# Patient Record
Sex: FEMALE
[De-identification: names, ages, dates, MRNs, and addresses within clinical notes are randomized per-mention and may not be internally consistent; named-entity substitution may affect disease eponyms.]

---

## 2021-04-23 ENCOUNTER — NON-APPOINTMENT (OUTPATIENT)
Age: 46
End: 2021-04-23

## 2021-04-23 ENCOUNTER — APPOINTMENT (OUTPATIENT)
Dept: ORTHOPEDIC SURGERY | Facility: CLINIC | Age: 46
End: 2021-04-23
Payer: COMMERCIAL

## 2021-04-23 VITALS — BODY MASS INDEX: 23.56 KG/M2 | HEIGHT: 60 IN | WEIGHT: 120 LBS

## 2021-04-23 DIAGNOSIS — S46.011A STRAIN OF MUSCLE(S) AND TENDON(S) OF THE ROTATOR CUFF OF RIGHT SHOULDER, INITIAL ENCOUNTER: ICD-10-CM

## 2021-04-23 PROCEDURE — 99204 OFFICE O/P NEW MOD 45 MIN: CPT

## 2021-04-23 PROCEDURE — 99072 ADDL SUPL MATRL&STAF TM PHE: CPT

## 2021-04-23 NOTE — DISCUSSION/SUMMARY
[de-identified] : The underlying pathophysiology was reviewed in great detail with the patient as well as the various treatment options, including ice, analgesics, NSAIDs, Physical therapy, steroid injections.\par \par MRI of the right shoulder and cervical spine were reviewed and discussed in great detail today. \par \par A prescription for Physical Therapy was provided.\par \par Activity modifications and restrictions were discussed. I advised avoiding overhead lifting. I advised the patient to work on good posture.\par \par FU 6 weeks. \par \par All questions were answered, all alternatives discussed and the patient is in complete agreement with that plan. Follow-up appointment as instructed. Any issues and the patient will call or come in sooner.

## 2021-04-23 NOTE — CONSULT LETTER
[Dear  ___] : Dear  [unfilled], [Consult Letter:] : I had the pleasure of evaluating your patient, [unfilled]. [Please see my note below.] : Please see my note below. [Consult Closing:] : Thank you very much for allowing me to participate in the care of this patient.  If you have any questions, please do not hesitate to contact me. [Sincerely,] : Sincerely, [FreeTextEntry3] : Dr. Neal Ahumada \par \par

## 2021-04-23 NOTE — HISTORY OF PRESENT ILLNESS
[de-identified] : JAIMIE KATE is a 45 year old RHD female presenting to the office complaining of right shoulder and neck pain. Patient was referred by Kishan Meredith, neurologist. Patient reports pain began in January 2021 after falling, landing on her right side. Patient has been under the care of Dr. Meredith who ordered MRI of the right shoulder and cervical spine. she presents today with the reports. She presents today with the imaging.  The patient describes the pain as a dull aching, and occasionally sharp pain localized to the anterior aspect of her right shoulder that is intermittent in nature. Her  symptoms are exacerbated with any movement of the shoulder. Patient reports the pain is waking her up at night.  Patient reports associated weakness. Denies numbness and tingling in the upper extremity.  Patient has completed a home exercise program noting improvements in strength and range of motion but not pain. Patient is taking NSAIDs for pain relief with moderate relief in symptoms. Patient denies any other complaints at this time.

## 2021-04-23 NOTE — PHYSICAL EXAM
[de-identified] : Right Upper Extremity\par o Shoulder :\par ¦ Inspection/Palpation : mild tenderness over the greater tuberosity, no acromioclavicular joint tenderness, no tenderness anterior and posterior glenohumeral joint,no swelling, no deformities\par ¦ Range of Motion : ACTIVE FORWARD ELEVATION: Measured at 155 degrees, ACTIVE EXTERNAL ROTATION: Measured at 80 degrees, ACTIVE INTERNAL ROTATION: Measured at T10 \par ¦ Strength : external rotation 5/5, internal rotation 5/5, supraspinatus 5/5\par ¦ Stability : no joint instability on provocative testing\par ¦ Tests/Signs : Neer (+), Richardson (+)\par o Upper Arm : no tenderness, no swelling, no deformities\par o Muscle Bulk : no atrophy\par o Sensation : sensation intact to light touch\par o Skin : no skin rash or discoloration\par o Vascular Exam : no edema, no cyanosis, radial and ulnar pulses normal\par \par Left Upper Extremity\par o Shoulder :\par ¦ Inspection/Palpation : no tenderness over the greater tuberosity, no acromioclavicular joint tenderness,  tenderness anterior and posterior glenohumeral joint,no swelling, no deformities\par ¦ Range of Motion : ACTIVE FORWARD ELEVATION: Measured at 155 degrees, ACTIVE EXTERNAL ROTATION: Measured at 80 degrees, ACTIVE INTERNAL ROTATION: Measured at T10\par ¦ Strength : external rotation 5/5, internal rotation 5/5, supraspinatus 5/5\par ¦ Stability : no joint instability on provocative testing\par ¦ Tests/Signs : Neer (-), Richardson (-)\par o Upper Arm : no tenderness, no swelling, no deformities\par o Muscle Bulk : no atrophy\par o Sensation : sensation intact to light touch\par o Skin : no skin rash or discoloration\par o Vascular Exam : no edema, no cyanosis, radial and ulnar pulses normal [de-identified] : Patient comes to today's visit with outside imaging already performed. I reviewed the images in detail with the patient and discussed the findings as highlighted below. \par \par o MRI of the right shoulder performed on 04/12/2021 at [address] MRI: Impression:\par 1. Mild diffuse rotator cuff tendinosis. A moderate grade bursa-sided tear within the anterior supraspinatus tendon. Question of the small adjacent calcific deposit within the anterior subdeltoid bursa abutting the bursal -sided fibers of the supraspinatus tendon.\par 2. Mild AC joint impingement, Moderate subacromial/subdeltoid bursitis.\par 3. SLAP Tear.

## 2024-04-16 ENCOUNTER — NON-APPOINTMENT (OUTPATIENT)
Age: 49
End: 2024-04-16